# Patient Record
Sex: MALE | Race: WHITE | NOT HISPANIC OR LATINO | Employment: FULL TIME | URBAN - NONMETROPOLITAN AREA
[De-identification: names, ages, dates, MRNs, and addresses within clinical notes are randomized per-mention and may not be internally consistent; named-entity substitution may affect disease eponyms.]

---

## 2023-06-14 PROBLEM — F41.1 GENERALIZED ANXIETY DISORDER: Status: ACTIVE | Noted: 2023-06-14

## 2023-06-14 PROBLEM — I95.9 HYPOTENSION: Status: ACTIVE | Noted: 2023-06-14

## 2023-06-14 PROBLEM — K70.30 ALCOHOLIC CIRRHOSIS (MULTI): Status: ACTIVE | Noted: 2023-06-14

## 2023-06-14 PROBLEM — R18.8 ASCITES: Status: ACTIVE | Noted: 2023-06-14

## 2023-06-14 PROBLEM — I85.00 VARICES, ESOPHAGEAL (MULTI): Status: ACTIVE | Noted: 2023-06-14

## 2023-06-14 PROBLEM — R53.1 GENERALIZED WEAKNESS: Status: ACTIVE | Noted: 2023-06-14

## 2023-06-14 PROBLEM — F10.20 ALCOHOLISM (MULTI): Status: ACTIVE | Noted: 2023-06-14

## 2023-06-14 RX ORDER — MIDODRINE HYDROCHLORIDE 10 MG/1
10 TABLET ORAL 3 TIMES DAILY
COMMUNITY
Start: 2021-08-25 | End: 2023-06-15 | Stop reason: ALTCHOICE

## 2023-06-14 RX ORDER — PROPRANOLOL HYDROCHLORIDE 10 MG/1
1 TABLET ORAL DAILY
COMMUNITY
Start: 2022-05-25 | End: 2023-06-15 | Stop reason: ALTCHOICE

## 2023-06-14 RX ORDER — SPIRONOLACTONE 25 MG/1
1 TABLET ORAL 2 TIMES DAILY
COMMUNITY
Start: 2022-06-03 | End: 2023-06-15 | Stop reason: ALTCHOICE

## 2023-06-14 RX ORDER — FUROSEMIDE 20 MG/1
1 TABLET ORAL DAILY
COMMUNITY
End: 2023-06-15 | Stop reason: ALTCHOICE

## 2023-06-14 RX ORDER — PANTOPRAZOLE SODIUM 40 MG/1
1 TABLET, DELAYED RELEASE ORAL 2 TIMES DAILY
COMMUNITY
Start: 2022-05-11 | End: 2023-06-15 | Stop reason: ALTCHOICE

## 2023-06-15 ENCOUNTER — OFFICE VISIT (OUTPATIENT)
Dept: PRIMARY CARE | Facility: CLINIC | Age: 48
End: 2023-06-15
Payer: MEDICAID

## 2023-06-15 ENCOUNTER — TELEPHONE (OUTPATIENT)
Dept: PRIMARY CARE | Facility: CLINIC | Age: 48
End: 2023-06-15

## 2023-06-15 VITALS
DIASTOLIC BLOOD PRESSURE: 46 MMHG | OXYGEN SATURATION: 96 % | HEIGHT: 68 IN | WEIGHT: 173.7 LBS | BODY MASS INDEX: 26.33 KG/M2 | HEART RATE: 82 BPM | SYSTOLIC BLOOD PRESSURE: 110 MMHG

## 2023-06-15 DIAGNOSIS — I95.9 HYPOTENSION, UNSPECIFIED HYPOTENSION TYPE: Primary | ICD-10-CM

## 2023-06-15 DIAGNOSIS — K70.30 ALCOHOLIC CIRRHOSIS OF LIVER WITHOUT ASCITES (MULTI): ICD-10-CM

## 2023-06-15 PROBLEM — F41.1 GENERALIZED ANXIETY DISORDER: Status: RESOLVED | Noted: 2023-06-14 | Resolved: 2023-06-15

## 2023-06-15 PROBLEM — I85.00 VARICES, ESOPHAGEAL (MULTI): Status: RESOLVED | Noted: 2023-06-14 | Resolved: 2023-06-15

## 2023-06-15 PROBLEM — R18.8 ASCITES: Status: RESOLVED | Noted: 2023-06-14 | Resolved: 2023-06-15

## 2023-06-15 PROBLEM — R53.1 GENERALIZED WEAKNESS: Status: RESOLVED | Noted: 2023-06-14 | Resolved: 2023-06-15

## 2023-06-15 PROCEDURE — 99214 OFFICE O/P EST MOD 30 MIN: CPT | Performed by: FAMILY MEDICINE

## 2023-06-15 PROCEDURE — 1036F TOBACCO NON-USER: CPT | Performed by: FAMILY MEDICINE

## 2023-06-15 ASSESSMENT — ENCOUNTER SYMPTOMS
DIARRHEA: 0
ABDOMINAL DISTENTION: 0
PALPITATIONS: 0
DIFFICULTY URINATING: 0
DIZZINESS: 0
CHEST TIGHTNESS: 0
HEADACHES: 0
BLOOD IN STOOL: 0
SLEEP DISTURBANCE: 0
COUGH: 0
SHORTNESS OF BREATH: 0
ABDOMINAL PAIN: 0
CONSTIPATION: 0
HEMATURIA: 0
FREQUENCY: 0
NERVOUS/ANXIOUS: 0
AGITATION: 0
FATIGUE: 0

## 2023-06-15 ASSESSMENT — PATIENT HEALTH QUESTIONNAIRE - PHQ9
1. LITTLE INTEREST OR PLEASURE IN DOING THINGS: NOT AT ALL
2. FEELING DOWN, DEPRESSED OR HOPELESS: NOT AT ALL
SUM OF ALL RESPONSES TO PHQ9 QUESTIONS 1 AND 2: 0

## 2023-06-15 NOTE — PROGRESS NOTES
"Subjective   Patient ID: Molly Green is a 47 y.o. male who presents for Annual Exam ([facility]).    HPI   The 1 thing he needed to do was stop drinking and it helped.  For almost 2 years now  He has stopped all the medications from renal, and his anxiety is much better stop the citalopram  At this point we need to do some follow-up blood test.  There was significant changes on the MRI of February 2022, I will recheck an ultrasound of the liver and stable    Think he might need yearly blood work for the next couple years, or if these labs are stable then we can repeat them when he turns 49.    Ultrasound of the liver  Labs    Blood pressure is a little low, does not drink a lot of caffeine, but does not he is eating well weight is going up.  Just needs to add more water to his diet.    Review of Systems   Constitutional:  Negative for fatigue.   Eyes:  Negative for visual disturbance.   Respiratory:  Negative for cough, chest tightness and shortness of breath.    Cardiovascular:  Negative for chest pain and palpitations.   Gastrointestinal:  Negative for abdominal distention, abdominal pain, blood in stool, constipation and diarrhea.   Genitourinary:  Negative for difficulty urinating, frequency and hematuria.   Skin:  Negative for rash.   Neurological:  Negative for dizziness and headaches.   Psychiatric/Behavioral:  Negative for agitation, behavioral problems and sleep disturbance. The patient is not nervous/anxious.        Objective   BP (!) 110/46   Pulse 82   Ht 1.727 m (5' 8\")   Wt 78.8 kg (173 lb 11.2 oz)   SpO2 96%   BMI 26.41 kg/m²     Physical Exam  Constitutional:       Appearance: Normal appearance.   HENT:      Head: Normocephalic and atraumatic.   Cardiovascular:      Rate and Rhythm: Normal rate and regular rhythm.      Heart sounds: Normal heart sounds.   Pulmonary:      Effort: Pulmonary effort is normal.      Breath sounds: Normal breath sounds.   Abdominal:      General: Abdomen is flat. There " is no distension.      Palpations: Abdomen is soft. There is no mass.      Tenderness: There is no abdominal tenderness.   Skin:     General: Skin is warm and dry.   Neurological:      General: No focal deficit present.      Mental Status: He is alert and oriented to person, place, and time.      Gait: Gait normal.   Psychiatric:         Mood and Affect: Mood normal.         Behavior: Behavior normal.         Thought Content: Thought content normal.         Judgment: Judgment normal.         Assessment/Plan   Problem List Items Addressed This Visit          Circulatory    Hypotension - Primary    Relevant Orders    CBC    Magnesium    Vitamin B12    Thyroid Stimulating Hormone    Folate    Basic Metabolic Panel    Hepatic Function Panel    US abdomen limited liver    Protime-INR       Other    Alcoholic cirrhosis (CMS/HCC)    Relevant Orders    CBC    Magnesium    Vitamin B12    Thyroid Stimulating Hormone    Folate    Basic Metabolic Panel    Hepatic Function Panel    US abdomen limited liver    Protime-INR

## 2024-01-12 ENCOUNTER — OFFICE VISIT (OUTPATIENT)
Dept: PRIMARY CARE | Facility: CLINIC | Age: 49
End: 2024-01-12
Payer: MEDICAID

## 2024-01-12 VITALS
SYSTOLIC BLOOD PRESSURE: 114 MMHG | OXYGEN SATURATION: 96 % | WEIGHT: 174.9 LBS | DIASTOLIC BLOOD PRESSURE: 62 MMHG | HEIGHT: 68 IN | BODY MASS INDEX: 26.51 KG/M2 | HEART RATE: 54 BPM

## 2024-01-12 DIAGNOSIS — K70.30 ALCOHOLIC CIRRHOSIS OF LIVER WITHOUT ASCITES (MULTI): Primary | ICD-10-CM

## 2024-01-12 DIAGNOSIS — R11.0 NAUSEA: ICD-10-CM

## 2024-01-12 DIAGNOSIS — I95.9 HYPOTENSION, UNSPECIFIED HYPOTENSION TYPE: ICD-10-CM

## 2024-01-12 DIAGNOSIS — R10.33 PERIUMBILICAL ABDOMINAL PAIN: ICD-10-CM

## 2024-01-12 DIAGNOSIS — R19.7 DIARRHEA, UNSPECIFIED TYPE: ICD-10-CM

## 2024-01-12 PROBLEM — F10.11 HISTORY OF ALCOHOL ABUSE: Status: ACTIVE | Noted: 2024-01-12

## 2024-01-12 PROCEDURE — 1036F TOBACCO NON-USER: CPT | Performed by: FAMILY MEDICINE

## 2024-01-12 PROCEDURE — 99214 OFFICE O/P EST MOD 30 MIN: CPT | Performed by: FAMILY MEDICINE

## 2024-01-12 RX ORDER — FAMOTIDINE 20 MG/1
20 TABLET, FILM COATED ORAL 2 TIMES DAILY
Qty: 60 TABLET | Refills: 5 | Status: SHIPPED | OUTPATIENT
Start: 2024-01-12 | End: 2024-07-10

## 2024-01-12 ASSESSMENT — ENCOUNTER SYMPTOMS
DYSURIA: 0
SLEEP DISTURBANCE: 0
DIZZINESS: 0
COUGH: 0
HEADACHES: 0
VOMITING: 0
CONSTIPATION: 0
SHORTNESS OF BREATH: 0
FATIGUE: 1
BLOOD IN STOOL: 0
CHILLS: 0
NAUSEA: 1
FEVER: 0
HEMATURIA: 0
ABDOMINAL PAIN: 1
DIFFICULTY URINATING: 0
PALPITATIONS: 0
DIARRHEA: 1

## 2024-01-12 NOTE — PROGRESS NOTES
"Subjective   Patient ID: Molly Green is a 48 y.o. male who presents for Diarrhea (X3 days).    Diarrhea   Associated symptoms include abdominal pain. Pertinent negatives include no chills, coughing, fever, headaches or vomiting.      He has not been drinking any alcohol for the last 2 years.  Still has some troubles being off between the history of cirrhosis and had troubles with his pancreas in the past.  Recently its mostly loose stools and maybe a little bit of diarrhea but a lot of nausea may be some black in the stool no blood in stool.  No troubles with urination no blood in the urine  Had troubles with insurance getting ultrasound done at [facility].  Did not get the lab work done.  No obvious bad food no ill contacts.    Exam is essentially benign in the belly maybe a little bit of tenderness around the periumbilical area but very mild no rebound.    Labs he will get done right away we will do those in [place].  Will schedule the ultrasound of the liver in [place].  He had some abnormality on the MRI was going to start with the ultrasound of the  Pepcid twice a day 20 mg.  As needed Imodium  No office visit set yet.  Review of Systems   Constitutional:  Positive for fatigue. Negative for chills and fever.   Respiratory:  Negative for cough and shortness of breath.    Cardiovascular:  Negative for chest pain and palpitations.   Gastrointestinal:  Positive for abdominal pain, diarrhea and nausea. Negative for blood in stool, constipation and vomiting.   Genitourinary:  Negative for difficulty urinating, dysuria and hematuria.   Skin:  Negative for rash.   Neurological:  Negative for dizziness and headaches.   Psychiatric/Behavioral:  Negative for sleep disturbance.        Objective   /62   Pulse 54   Ht 1.727 m (5' 8\")   Wt 79.3 kg (174 lb 14.4 oz)   SpO2 96%   BMI 26.59 kg/m²     Physical Exam  Constitutional:       Appearance: Normal appearance.   HENT:      Head: Normocephalic and " atraumatic.   Cardiovascular:      Rate and Rhythm: Normal rate and regular rhythm.      Heart sounds: Normal heart sounds.   Pulmonary:      Effort: Pulmonary effort is normal.      Breath sounds: Normal breath sounds.   Skin:     General: Skin is warm and dry.   Neurological:      General: No focal deficit present.      Mental Status: He is alert and oriented to person, place, and time.   Psychiatric:         Mood and Affect: Mood normal.         Behavior: Behavior normal.         Thought Content: Thought content normal.         Judgment: Judgment normal.         Assessment/Plan   Problem List Items Addressed This Visit             ICD-10-CM    Alcoholic cirrhosis (CMS/HCC) - Primary K70.30    Relevant Medications    famotidine (Pepcid) 20 mg tablet    Other Relevant Orders    CBC    Magnesium    Vitamin B12    Thyroid Stimulating Hormone    Prostate Specific Antigen    Hepatic Function Panel    Basic Metabolic Panel    Folate    Protime-INR    US abdomen limited liver    Lipase    Hypotension I95.9    Relevant Medications    famotidine (Pepcid) 20 mg tablet    Other Relevant Orders    CBC    Magnesium    Vitamin B12    Thyroid Stimulating Hormone    Prostate Specific Antigen    Hepatic Function Panel    Basic Metabolic Panel    Folate    Protime-INR    US abdomen limited liver    Lipase    Diarrhea R19.7    Relevant Medications    famotidine (Pepcid) 20 mg tablet    Other Relevant Orders    CBC    Magnesium    Vitamin B12    Thyroid Stimulating Hormone    Prostate Specific Antigen    Hepatic Function Panel    Basic Metabolic Panel    Folate    Protime-INR    US abdomen limited liver    Lipase    Nausea R11.0    Relevant Medications    famotidine (Pepcid) 20 mg tablet    Other Relevant Orders    CBC    Magnesium    Vitamin B12    Thyroid Stimulating Hormone    Prostate Specific Antigen    Hepatic Function Panel    Basic Metabolic Panel    Folate    Protime-INR    US abdomen limited liver    Lipase     Other Visit  Diagnoses         Codes    Periumbilical abdominal pain     R10.33    Relevant Medications    famotidine (Pepcid) 20 mg tablet    Other Relevant Orders    CBC    Magnesium    Vitamin B12    Thyroid Stimulating Hormone    Prostate Specific Antigen    Hepatic Function Panel    Basic Metabolic Panel    Folate    Protime-INR    US abdomen limited liver    Lipase